# Patient Record
Sex: MALE | Race: WHITE | NOT HISPANIC OR LATINO
[De-identification: names, ages, dates, MRNs, and addresses within clinical notes are randomized per-mention and may not be internally consistent; named-entity substitution may affect disease eponyms.]

---

## 2022-04-12 ENCOUNTER — APPOINTMENT (OUTPATIENT)
Dept: ORTHOPEDIC SURGERY | Facility: CLINIC | Age: 52
End: 2022-04-12

## 2022-04-12 PROBLEM — Z00.00 ENCOUNTER FOR PREVENTIVE HEALTH EXAMINATION: Status: ACTIVE | Noted: 2022-04-12

## 2023-04-25 ENCOUNTER — APPOINTMENT (OUTPATIENT)
Dept: ORTHOPEDIC SURGERY | Facility: CLINIC | Age: 53
End: 2023-04-25
Payer: COMMERCIAL

## 2023-04-25 VITALS — BODY MASS INDEX: 27.4 KG/M2 | WEIGHT: 185 LBS | HEIGHT: 69 IN

## 2023-04-25 DIAGNOSIS — Z78.9 OTHER SPECIFIED HEALTH STATUS: ICD-10-CM

## 2023-04-25 PROCEDURE — 99214 OFFICE O/P EST MOD 30 MIN: CPT

## 2023-04-25 NOTE — DISCUSSION/SUMMARY
[de-identified] : Discussed the nature of the diagnosis and risk and benefits of different modalities of treatment.\par Reviewed MRI of LT forearm from 2021.\par Discussed excision of STM lt forearm. He would like to proceed surgically and will be contacted by surgical coordinator. \par \par The risks of the procedure, including but not limited to infection, bleeding, anesthetic complication, neurovascular injury and the possibility of subsequent surgery were discussed; the benefits, non-operative alternatives and expectations of the proposed procedure were also discussed. Post-operative management, the procedure itself and the expected recovery period were discussed at length with the patient. The need for post-operative physical therapy and home exercise regimen, possible bracing and medication management were also discussed.\par

## 2023-04-25 NOTE — PHYSICAL EXAM
[Left] : left elbow [FreeTextEntry3] : palpable mass over the junction of proximal and middle 1/3 of the forearm over the ulnar nerve , tender [de-identified] : FDI 5/5

## 2023-04-25 NOTE — DATA REVIEWED
[MRI] : MRI [Left] : left [I reviewed the films/CD and agree] : I reviewed the films/CD and agree [FreeTextEntry1] : O&C 9/26/2021: 1. Focal mass like enlargement of the ulnar nerve in the volar mid elbow in the area of concern suspicious for a \par mass associated with the nerve potentially a neurofibroma, meningioma or schwannoma without aggressive \par MRI characteristics such as surrounding muscle edema or swelling. Clinical correlation is recommended. \par Consider excisional biopsy.\par 2. Mild patchy edema in the dorsal proximal extensor musculature potentially related to denervation, although \par slight strain/myositis could look similar. Consider EMG.\par 3. No acute fracture or malalignment of the radius and ulna.\par 4. No disproportionate muscle atrophy.

## 2023-04-25 NOTE — HISTORY OF PRESENT ILLNESS
[8] : 8 [2] : 2 [Localized] : localized [Sharp] : sharp [Full time] : Work status: full time [de-identified] : 52 year old male previously followed for a soft tissue mass of forearm . He was scheduled for excision in 2021 and had to cancel. Was not rescheduled.  [] : Post Surgical Visit: no [FreeTextEntry1] : L elbow [FreeTextEntry3] : N/A Chronic [FreeTextEntry5] : 51 y/o RHD M eval L elbow. Pt presrents with atraumatic chronic pain due to HX of a soft tissue mass. No recent TX. Was scheduled for SX w/ Dr APOLLO Arora in 10/2021 [de-identified] : None [de-identified] :

## 2023-06-07 ENCOUNTER — APPOINTMENT (OUTPATIENT)
Age: 53
End: 2023-06-07
Payer: COMMERCIAL

## 2023-06-07 PROCEDURE — 25075 EXC FOREARM LES SC < 3 CM: CPT | Mod: AS,LT

## 2023-06-07 PROCEDURE — 25075 EXC FOREARM LES SC < 3 CM: CPT | Mod: LT

## 2023-06-07 RX ORDER — OXYCODONE 5 MG/1
5 TABLET ORAL
Qty: 5 | Refills: 0 | Status: ACTIVE | COMMUNITY
Start: 2023-06-07 | End: 1900-01-01

## 2023-06-20 ENCOUNTER — APPOINTMENT (OUTPATIENT)
Dept: ORTHOPEDIC SURGERY | Facility: CLINIC | Age: 53
End: 2023-06-20
Payer: COMMERCIAL

## 2023-06-20 VITALS — HEIGHT: 69 IN | BODY MASS INDEX: 27.85 KG/M2 | WEIGHT: 188 LBS

## 2023-06-20 DIAGNOSIS — M79.89 OTHER SPECIFIED SOFT TISSUE DISORDERS: ICD-10-CM

## 2023-06-20 PROCEDURE — 99024 POSTOP FOLLOW-UP VISIT: CPT

## 2023-06-20 NOTE — DISCUSSION/SUMMARY
[de-identified] : Bandage and sutures removed today.\par No heavy lifting 21 more week.\par AROM\par Wound care discussed.\par Pathology discussed. \par RTO 4 weeks.

## 2023-06-20 NOTE — HISTORY OF PRESENT ILLNESS
[0] : 0 [Full time] : Work status: full time [de-identified] : 52 year old male presenting 13 days s/p Excision of tumor of ulnar nerve, left forearm. He is doing well, no complaints.\par DOS: 6/7/23 \par *PAthology: Schwannoma [] : This patient has had an injection before: no [FreeTextEntry1] : L forearm [FreeTextEntry3] : 6/7/23 [FreeTextEntry5] : Pt is a 53 y/o RHD who presents 13 days s/p L forearm ulnar nerve tumor exscision on above DOS. Pt states recovery is going well with no pain  [de-identified] : None [de-identified] :   [de-identified] : 6/7/23 [de-identified] : L forearm ulnar tumor excision

## 2023-07-18 ENCOUNTER — APPOINTMENT (OUTPATIENT)
Dept: ORTHOPEDIC SURGERY | Facility: CLINIC | Age: 53
End: 2023-07-18